# Patient Record
Sex: MALE | Race: BLACK OR AFRICAN AMERICAN | Employment: UNEMPLOYED | URBAN - METROPOLITAN AREA
[De-identification: names, ages, dates, MRNs, and addresses within clinical notes are randomized per-mention and may not be internally consistent; named-entity substitution may affect disease eponyms.]

---

## 2020-10-30 ENCOUNTER — HOSPITAL ENCOUNTER (EMERGENCY)
Age: 35
Discharge: HOME | End: 2020-10-30
Attending: EMERGENCY MEDICINE
Payer: MEDICAID

## 2020-10-30 ENCOUNTER — APPOINTMENT (OUTPATIENT)
Dept: CT IMAGING | Age: 35
End: 2020-10-30
Payer: MEDICAID

## 2020-10-30 VITALS
RESPIRATION RATE: 18 BRPM | HEIGHT: 67 IN | TEMPERATURE: 96.1 F | DIASTOLIC BLOOD PRESSURE: 72 MMHG | HEART RATE: 76 BPM | OXYGEN SATURATION: 98 % | BODY MASS INDEX: 20.4 KG/M2 | WEIGHT: 130 LBS | SYSTOLIC BLOOD PRESSURE: 112 MMHG

## 2020-10-30 PROCEDURE — 99283 EMERGENCY DEPT VISIT LOW MDM: CPT

## 2020-10-30 PROCEDURE — 96372 THER/PROPH/DIAG INJ SC/IM: CPT

## 2020-10-30 PROCEDURE — 6360000002 HC RX W HCPCS: Performed by: EMERGENCY MEDICINE

## 2020-10-30 PROCEDURE — 72125 CT NECK SPINE W/O DYE: CPT

## 2020-10-30 PROCEDURE — 70450 CT HEAD/BRAIN W/O DYE: CPT

## 2020-10-30 RX ORDER — DROPERIDOL 2.5 MG/ML
5 INJECTION, SOLUTION INTRAMUSCULAR; INTRAVENOUS ONCE
Status: COMPLETED | OUTPATIENT
Start: 2020-10-30 | End: 2020-10-30

## 2020-10-30 RX ORDER — DROPERIDOL 2.5 MG/ML
5 INJECTION, SOLUTION INTRAMUSCULAR; INTRAVENOUS ONCE
Status: DISCONTINUED | OUTPATIENT
Start: 2020-10-30 | End: 2020-10-30

## 2020-10-30 RX ORDER — MIDAZOLAM HYDROCHLORIDE 1 MG/ML
2 INJECTION INTRAMUSCULAR; INTRAVENOUS ONCE
Status: COMPLETED | OUTPATIENT
Start: 2020-10-30 | End: 2020-10-30

## 2020-10-30 RX ORDER — MIDAZOLAM HYDROCHLORIDE 1 MG/ML
2 INJECTION INTRAMUSCULAR; INTRAVENOUS ONCE
Status: DISCONTINUED | OUTPATIENT
Start: 2020-10-30 | End: 2020-10-30

## 2020-10-30 RX ORDER — KETAMINE HYDROCHLORIDE 100 MG/ML
INJECTION, SOLUTION INTRAMUSCULAR; INTRAVENOUS
Status: DISCONTINUED
Start: 2020-10-30 | End: 2020-10-30 | Stop reason: WASHOUT

## 2020-10-30 RX ORDER — MIDAZOLAM HYDROCHLORIDE 1 MG/ML
INJECTION INTRAMUSCULAR; INTRAVENOUS
Status: DISCONTINUED
Start: 2020-10-30 | End: 2020-10-30 | Stop reason: HOSPADM

## 2020-10-30 RX ADMIN — MIDAZOLAM 2 MG: 1 INJECTION INTRAMUSCULAR; INTRAVENOUS at 04:00

## 2020-10-30 RX ADMIN — DROPERIDOL 5 MG: 2.5 INJECTION, SOLUTION INTRAMUSCULAR; INTRAVENOUS at 03:50
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
## 2020-10-30 NOTE — ED NOTES
Nurse to Nurse called to [facility] [phone], spoke with Nurse Dwayne Haynes, [place]  10/30/20 9501

## 2021-11-02 ENCOUNTER — HOSPITAL ENCOUNTER (EMERGENCY)
Age: 36
Discharge: HOME | End: 2021-11-02
Attending: EMERGENCY MEDICINE
Payer: MEDICAID

## 2021-11-02 ENCOUNTER — APPOINTMENT (OUTPATIENT)
Dept: CT IMAGING | Age: 36
End: 2021-11-02
Payer: MEDICAID

## 2021-11-02 VITALS
DIASTOLIC BLOOD PRESSURE: 81 MMHG | OXYGEN SATURATION: 95 % | HEART RATE: 71 BPM | TEMPERATURE: 98 F | HEIGHT: 67 IN | RESPIRATION RATE: 16 BRPM | SYSTOLIC BLOOD PRESSURE: 128 MMHG | BODY MASS INDEX: 20.4 KG/M2 | WEIGHT: 130 LBS

## 2021-11-02 DIAGNOSIS — R10.13 ABDOMINAL PAIN, EPIGASTRIC: Primary | ICD-10-CM

## 2021-11-02 LAB
ALBUMIN SERPL-MCNC: 4.9 G/DL (ref 3.5–5.2)
ALP BLD-CCNC: 80 U/L (ref 40–129)
ALT SERPL-CCNC: 20 U/L (ref 0–40)
ANION GAP SERPL CALCULATED.3IONS-SCNC: 13 MMOL/L (ref 7–16)
AST SERPL-CCNC: 19 U/L (ref 0–39)
BASOPHILS ABSOLUTE: 0.02 E9/L (ref 0–0.2)
BASOPHILS RELATIVE PERCENT: 0.3 % (ref 0–2)
BILIRUB SERPL-MCNC: 0.8 MG/DL (ref 0–1.2)
BUN BLDV-MCNC: 11 MG/DL (ref 6–20)
CALCIUM SERPL-MCNC: 9.8 MG/DL (ref 8.6–10.2)
CHLORIDE BLD-SCNC: 98 MMOL/L (ref 98–107)
CO2: 23 MMOL/L (ref 22–29)
CREAT SERPL-MCNC: 0.9 MG/DL (ref 0.7–1.2)
EOSINOPHILS ABSOLUTE: 0.06 E9/L (ref 0.05–0.5)
EOSINOPHILS RELATIVE PERCENT: 1 % (ref 0–6)
GFR AFRICAN AMERICAN: >60
GFR NON-AFRICAN AMERICAN: >60 ML/MIN/1.73
GLUCOSE BLD-MCNC: 101 MG/DL (ref 74–99)
HCT VFR BLD CALC: 45.8 % (ref 37–54)
HEMOGLOBIN: 15.8 G/DL (ref 12.5–16.5)
IMMATURE GRANULOCYTES #: 0.01 E9/L
IMMATURE GRANULOCYTES %: 0.2 % (ref 0–5)
LACTIC ACID: 1.3 MMOL/L (ref 0.5–2.2)
LIPASE: 31 U/L (ref 13–60)
LYMPHOCYTES ABSOLUTE: 1.11 E9/L (ref 1.5–4)
LYMPHOCYTES RELATIVE PERCENT: 18.7 % (ref 20–42)
MCH RBC QN AUTO: 30.7 PG (ref 26–35)
MCHC RBC AUTO-ENTMCNC: 34.5 % (ref 32–34.5)
MCV RBC AUTO: 89.1 FL (ref 80–99.9)
MONOCYTES ABSOLUTE: 0.39 E9/L (ref 0.1–0.95)
MONOCYTES RELATIVE PERCENT: 6.6 % (ref 2–12)
NEUTROPHILS ABSOLUTE: 4.36 E9/L (ref 1.8–7.3)
NEUTROPHILS RELATIVE PERCENT: 73.2 % (ref 43–80)
PDW BLD-RTO: 12.5 FL (ref 11.5–15)
PLATELET # BLD: 183 E9/L (ref 130–450)
PMV BLD AUTO: 9.5 FL (ref 7–12)
POTASSIUM SERPL-SCNC: 4.4 MMOL/L (ref 3.5–5)
RBC # BLD: 5.14 E12/L (ref 3.8–5.8)
SODIUM BLD-SCNC: 134 MMOL/L (ref 132–146)
TOTAL PROTEIN: 8.2 G/DL (ref 6.4–8.3)
WBC # BLD: 6 E9/L (ref 4.5–11.5)

## 2021-11-02 PROCEDURE — 6360000004 HC RX CONTRAST MEDICATION: Performed by: RADIOLOGY

## 2021-11-02 PROCEDURE — 2500000003 HC RX 250 WO HCPCS: Performed by: STUDENT IN AN ORGANIZED HEALTH CARE EDUCATION/TRAINING PROGRAM

## 2021-11-02 PROCEDURE — 74177 CT ABD & PELVIS W/CONTRAST: CPT

## 2021-11-02 PROCEDURE — 6370000000 HC RX 637 (ALT 250 FOR IP): Performed by: PHYSICIAN ASSISTANT

## 2021-11-02 PROCEDURE — 85025 COMPLETE CBC W/AUTO DIFF WBC: CPT

## 2021-11-02 PROCEDURE — 99284 EMERGENCY DEPT VISIT MOD MDM: CPT

## 2021-11-02 PROCEDURE — 96372 THER/PROPH/DIAG INJ SC/IM: CPT

## 2021-11-02 PROCEDURE — 6370000000 HC RX 637 (ALT 250 FOR IP): Performed by: STUDENT IN AN ORGANIZED HEALTH CARE EDUCATION/TRAINING PROGRAM

## 2021-11-02 PROCEDURE — 96374 THER/PROPH/DIAG INJ IV PUSH: CPT

## 2021-11-02 PROCEDURE — 83690 ASSAY OF LIPASE: CPT

## 2021-11-02 PROCEDURE — 83605 ASSAY OF LACTIC ACID: CPT

## 2021-11-02 PROCEDURE — 6360000002 HC RX W HCPCS: Performed by: STUDENT IN AN ORGANIZED HEALTH CARE EDUCATION/TRAINING PROGRAM

## 2021-11-02 PROCEDURE — 80053 COMPREHEN METABOLIC PANEL: CPT

## 2021-11-02 RX ORDER — ONDANSETRON 4 MG/1
4 TABLET, ORALLY DISINTEGRATING ORAL ONCE
Status: COMPLETED | OUTPATIENT
Start: 2021-11-02 | End: 2021-11-02

## 2021-11-02 RX ORDER — DICYCLOMINE HYDROCHLORIDE 10 MG/ML
20 INJECTION INTRAMUSCULAR ONCE
Status: COMPLETED | OUTPATIENT
Start: 2021-11-02 | End: 2021-11-02

## 2021-11-02 RX ORDER — MAGNESIUM HYDROXIDE/ALUMINUM HYDROXICE/SIMETHICONE 120; 1200; 1200 MG/30ML; MG/30ML; MG/30ML
30 SUSPENSION ORAL ONCE
Status: DISCONTINUED | OUTPATIENT
Start: 2021-11-02 | End: 2021-11-02 | Stop reason: HOSPADM

## 2021-11-02 RX ORDER — OMEPRAZOLE 20 MG/1
20 CAPSULE, DELAYED RELEASE ORAL
Qty: 30 CAPSULE | Refills: 0 | Status: SHIPPED | OUTPATIENT
Start: 2021-11-02

## 2021-11-02 RX ORDER — SUCRALFATE 1 G/1
1 TABLET ORAL 4 TIMES DAILY
Qty: 120 TABLET | Refills: 0 | Status: SHIPPED | OUTPATIENT
Start: 2021-11-02

## 2021-11-02 RX ORDER — ONDANSETRON 4 MG/1
TABLET, ORALLY DISINTEGRATING ORAL
Status: DISCONTINUED
Start: 2021-11-02 | End: 2021-11-02 | Stop reason: HOSPADM

## 2021-11-02 RX ORDER — MAGNESIUM HYDROXIDE/ALUMINUM HYDROXICE/SIMETHICONE 120; 1200; 1200 MG/30ML; MG/30ML; MG/30ML
SUSPENSION ORAL
Status: DISCONTINUED
Start: 2021-11-02 | End: 2021-11-02 | Stop reason: HOSPADM

## 2021-11-02 RX ORDER — OXYCODONE HYDROCHLORIDE AND ACETAMINOPHEN 5; 325 MG/1; MG/1
1 TABLET ORAL ONCE
Status: COMPLETED | OUTPATIENT
Start: 2021-11-02 | End: 2021-11-02

## 2021-11-02 RX ORDER — LIDOCAINE HYDROCHLORIDE 20 MG/ML
5 SOLUTION OROPHARYNGEAL ONCE
Status: COMPLETED | OUTPATIENT
Start: 2021-11-02 | End: 2021-11-02

## 2021-11-02 RX ADMIN — OXYCODONE AND ACETAMINOPHEN 1 TABLET: 5; 325 TABLET ORAL at 17:34

## 2021-11-02 RX ADMIN — ONDANSETRON 4 MG: 4 TABLET, ORALLY DISINTEGRATING ORAL at 11:50

## 2021-11-02 RX ADMIN — DICYCLOMINE HYDROCHLORIDE 20 MG: 10 INJECTION INTRAMUSCULAR at 13:35

## 2021-11-02 RX ADMIN — LIDOCAINE HYDROCHLORIDE 5 ML: 20 SOLUTION OROPHARYNGEAL at 12:33

## 2021-11-02 RX ADMIN — IOPAMIDOL 90 ML: 755 INJECTION, SOLUTION INTRAVENOUS at 15:42

## 2021-11-02 RX ADMIN — FAMOTIDINE 20 MG: 10 INJECTION INTRAVENOUS at 13:35

## 2021-11-02 ASSESSMENT — ENCOUNTER SYMPTOMS
WHEEZING: 0
EYE PAIN: 0
SORE THROAT: 0
CONSTIPATION: 0
SHORTNESS OF BREATH: 0
NAUSEA: 1
PHOTOPHOBIA: 0
DIARRHEA: 0
RHINORRHEA: 0
COUGH: 0
ABDOMINAL PAIN: 1
TROUBLE SWALLOWING: 0
VOMITING: 0
CHEST TIGHTNESS: 0
EYE REDNESS: 0
BACK PAIN: 0
APNEA: 0

## 2021-11-02 ASSESSMENT — PAIN DESCRIPTION - DESCRIPTORS: DESCRIPTORS: CRAMPING

## 2021-11-02 ASSESSMENT — PAIN DESCRIPTION - LOCATION: LOCATION: ABDOMEN

## 2021-11-02 ASSESSMENT — PAIN SCALES - GENERAL
PAINLEVEL_OUTOF10: 10
PAINLEVEL_OUTOF10: 8

## 2021-11-02 ASSESSMENT — PAIN DESCRIPTION - PAIN TYPE: TYPE: ACUTE PAIN

## 2021-11-02 ASSESSMENT — PAIN DESCRIPTION - ORIENTATION: ORIENTATION: MID

## 2021-11-02 NOTE — ED NOTES
FIRST PROVIDER CONTACT ASSESSMENT NOTE           Department of Emergency Medicine                 First Provider Note            21  11:32 AM EDT    Date of Encounter: No admission date for patient encounter. Patient Name: Pedro Stubbs  : 1985  MRN: [id number]    Chief Complaint: Abdominal Pain (middle epigastric pain that started yesterday. Per pt feels like it is coming up in throat. Nausea w/o emesis.)      History of Present Illness:   Pedro Stubbs is a 29 y.o. male who presents to the ED for abdominal pain. Midepigastric that started yesterday. Patient feels like something is \"going to come up his throat. \"  Patient has nausea but no emesis. Patient recently returned from [facility]    Focused Physical Exam:  VS:    ED Triage Vitals [21 1053]   BP Temp Temp src Pulse Resp SpO2 Height Weight   -- -- -- 83 -- 96 % -- --        Physical Ex: Constitutional: Alert and non-toxic. Medical History:  has a past medical history of ADHD (attention deficit hyperactivity disorder), Asthma, Depression, and Insomnia. Surgical History:  has no past surgical history on file. Social History:  reports that he has been smoking cigarettes. He has been smoking about 1.00 pack per day. He has never used smokeless tobacco. He reports current alcohol use. He reports current drug use. Drug: Marijuana [employer]der Sails). Family History: family history is not on file. Allergies: Patient has no known allergies.      Initial Plan of Care: Initiate Treatment-Testing, Proceed toTreatment Area When Bed Available for ED Attending/MLP to Continue Care      ---END OF FIRST PROVIDER CONTACT ASSESSMENT NOTE---  Electronically signed by Silas Bal PA-C   DD: 21       Silas Bal PA-C  21 [id number]

## 2021-11-02 NOTE — ED PROVIDER NOTES
[address]      Pt Name: Carmelina Fan  MRN: [id number]  [place] 1985  Date of evaluation: 11/2/2021      CHIEF COMPLAINT       Chief Complaint   Patient presents with    Abdominal Pain     middle epigastric pain that started yesterday. Per pt feels like it is coming up in throat. Nausea w/o emesis. HPI  Carmelina Fan is a 29 y.o. male history of asthma who presents to the emergency department with abdominal pain. The patient states that for the last day or so he has had abdominal pain. He states that it goes from his epigastrium down towards his bellybutton. He describes a sharp pain. He states he has associated nausea but no vomiting. Pain is constant, mild to moderate, nothing makes it better or worse. GI cocktail given in triage did not help the symptoms. He denies any history of abdominal surgeries. Denies any cough, fevers, chills, chest pain or shortness of breath. He occasionally takes his ibuprofen. He was in [place] and had been drinking Nancy just prior to the abdominal pain. Review of Systems   Constitutional: Negative for activity change, chills, diaphoresis, fatigue and fever. HENT: Negative for rhinorrhea, sore throat and trouble swallowing. Eyes: Negative for photophobia, pain and redness. Respiratory: Negative for apnea, cough, chest tightness, shortness of breath and wheezing. Cardiovascular: Negative for chest pain, palpitations and leg swelling. Gastrointestinal: Positive for abdominal pain and nausea. Negative for constipation, diarrhea and vomiting. Endocrine: Negative for polyuria. Genitourinary: Negative for difficulty urinating and dysuria. Musculoskeletal: Negative for back pain, neck pain and neck stiffness. Skin: Negative for pallor and rash. Neurological: Negative for dizziness, syncope, weakness, light-headedness and numbness. Psychiatric/Behavioral: Negative for confusion. The patient is not nervous/anxious. Physical Exam  Vitals and nursing note reviewed. Constitutional:       General: He is not in acute distress. Appearance: He is well-developed. Comments: Awake and alert. Sitting in the gurney in no obvious distress. HENT:      Head: Normocephalic and atraumatic. Mouth/Throat:      Mouth: Mucous membranes are moist.      Pharynx: No oropharyngeal exudate. Eyes:      General: No scleral icterus. Pupils: Pupils are equal, round, and reactive to light. Cardiovascular:      Rate and Rhythm: Normal rate and regular rhythm. Heart sounds: Normal heart sounds. No murmur heard. Comments: 2+ radial and dorsal pedis pulses bilaterally  Pulmonary:      Effort: Pulmonary effort is normal. No respiratory distress. Breath sounds: Normal breath sounds. No wheezing. Abdominal:      Palpations: Abdomen is soft. Tenderness: There is no abdominal tenderness. There is no guarding or rebound. Musculoskeletal:         General: No tenderness or deformity. Normal range of motion. Cervical back: Normal range of motion and neck supple. Right lower leg: No edema. Left lower leg: No edema. Skin:     General: Skin is warm and dry. Capillary Refill: Capillary refill takes less than 2 seconds. Findings: No rash. Neurological:      General: No focal deficit present. Mental Status: He is alert and oriented to person, place, and time. Cranial Nerves: No cranial nerve deficit. Sensory: No sensory deficit. Motor: No weakness or abnormal muscle tone. Psychiatric:         Mood and Affect: Mood normal.         Behavior: Behavior normal.          Procedures     MDM   This is a 72-year-old male who presents to the emergency department with epigastric pain.   In the emergency department the patient is awake and alert, hemodynamically stable, afebrile and in no respiratory distress. Abdomen soft nontender. CT imaging showed no signs of acute intra-abdominal pathology. Patient symptoms more consistent with possible peptic ulcer disease versus GERD. Patient feeling better after supportive therapy in the ED. Metabolic panel showed normal electrolytes normal renal function. Lactic acid reassuring. No leukocytosis. Repeat abdominal exam showed benign abdomen. Discussed plan for discharge home as well as return precautions and plan for close outpatient follow-up with GI and patient understands and agrees to plan.                    --------------------------------------------- PAST HISTORY ---------------------------------------------  Past Medical History:  has a past medical history of ADHD (attention deficit hyperactivity disorder), Asthma, Depression, and Insomnia. Past Surgical History:  has no past surgical history on file. Social History:  reports that he has been smoking cigarettes. He has been smoking about 1.00 pack per day. He has never used smokeless tobacco. He reports current alcohol use. He reports current drug use. Drug: Marijuana Charmayne Stai). Family History: family history is not on file. The patients home medications have been reviewed. Allergies: Patient has no known allergies.     -------------------------------------------------- RESULTS -------------------------------------------------  Labs:  Results for orders placed or performed during the hospital encounter of 11/02/21   CBC auto differential   Result Value Ref Range    WBC 6.0 4.5 - 11.5 E9/L    RBC 5.14 3.80 - 5.80 E12/L    Hemoglobin 15.8 12.5 - 16.5 g/dL    Hematocrit 45.8 37.0 - 54.0 %    MCV 89.1 80.0 - 99.9 fL    MCH 30.7 26.0 - 35.0 pg    MCHC 34.5 32.0 - 34.5 %    RDW 12.5 11.5 - 15.0 fL    Platelets 855 725 - 422 E9/L    MPV 9.5 7.0 - 12.0 fL    Neutrophils % 73.2 43.0 - 80.0 %    Immature Granulocytes % 0.2 0.0 - 5.0 %    Lymphocytes % 18.7 (L) 20.0 - 42.0 %    Monocytes % 6.6 2.0 - 12.0 %    Eosinophils % 1.0 0.0 - 6.0 %    Basophils % 0.3 0.0 - 2.0 %    Neutrophils Absolute 4.36 1.80 - 7.30 E9/L    Immature Granulocytes # 0.01 E9/L    Lymphocytes Absolute 1.11 (L) 1.50 - 4.00 E9/L    Monocytes Absolute 0.39 0.10 - 0.95 E9/L    Eosinophils Absolute 0.06 0.05 - 0.50 E9/L    Basophils Absolute 0.02 0.00 - 0.20 E9/L   Lactic Acid, Plasma   Result Value Ref Range    Lactic Acid 1.3 0.5 - 2.2 mmol/L   Lipase   Result Value Ref Range    Lipase 31 13 - 60 U/L   Comprehensive Metabolic Panel   Result Value Ref Range    Sodium 134 132 - 146 mmol/L    Potassium 4.4 3.5 - 5.0 mmol/L    Chloride 98 98 - 107 mmol/L    CO2 23 22 - 29 mmol/L    Anion Gap 13 7 - 16 mmol/L    Glucose 101 (H) 74 - 99 mg/dL    BUN 11 6 - 20 mg/dL    CREATININE 0.9 0.7 - 1.2 mg/dL    GFR Non-African American >60 >=60 mL/min/1.73    GFR African American >60     Calcium 9.8 8.6 - 10.2 mg/dL    Total Protein 8.2 6.4 - 8.3 g/dL    Albumin 4.9 3.5 - 5.2 g/dL    Total Bilirubin 0.8 0.0 - 1.2 mg/dL    Alkaline Phosphatase 80 40 - 129 U/L    ALT 20 0 - 40 U/L    AST 19 0 - 39 U/L       Radiology:  CT ABDOMEN PELVIS W IV CONTRAST Additional Contrast? None   Final Result   Mildly dilated loops of small bowel in the mid abdomen may represent focal   ileus. Thickening of the gastric antrum, suspicious for gastritis. No evidence of obstructive uropathy or acute appendicitis.             ------------------------- NURSING NOTES AND VITALS REVIEWED ---------------------------  Date / Time Roomed:  11/2/2021 12:53 PM  ED Bed Assignment:  JOSE/JOSE    The nursing notes within the ED encounter and vital signs as below have been reviewed.    /81   Pulse 71   Temp 98 °F (36.7 °C)   Resp 16   Ht 5' 7\" (1.702 m)   Wt 130 lb (59 kg)   SpO2 95%   BMI 20.36 kg/m²   Oxygen Saturation Interpretation: Normal      ------------------------------------------ PROGRESS NOTES ------------------------------------------    I have spoken with the patient and discussed todays results, in addition to providing specific details for the plan of care and counseling regarding the diagnosis and prognosis. Their questions are answered at this time and they are agreeable with the plan. I discussed at length with them reasons for immediate return here for re evaluation. They will followup with their primary care physician by calling their office tomorrow. --------------------------------- ADDITIONAL PROVIDER NOTES ---------------------------------  At this time the patient is without objective evidence of an acute process requiring hospitalization or inpatient management. They have remained hemodynamically stable throughout their entire ED visit and are stable for discharge with outpatient follow-up. The plan has been discussed in detail and they are aware of the specific conditions for emergent return, as well as the importance of follow-up. Discharge Medication List as of 11/2/2021  4:37 PM      START taking these medications    Details   sucralfate (CARAFATE) 1 GM tablet Take 1 tablet by mouth 4 times daily, Disp-120 tablet, R-0Print      omeprazole (PRILOSEC) 20 MG delayed release capsule Take 1 capsule by mouth every morning (before breakfast), Disp-30 capsule, R-0Print             Diagnosis:  1. Abdominal pain, epigastric        Disposition:  Patient's disposition: Discharge to home  Patient's condition is stable.        Phyllis Moura DO  Resident  11/03/21 5256

## 2021-11-02 NOTE — ED NOTES
Bed: HBB  Expected date:   Expected time:   Means of arrival:   Comments:  triage     Acacia De La Cruz RN  11/02/21 6841

## 2021-11-02 NOTE — Clinical Note
Jordyn Angel was seen and treated in our emergency department on 11/2/2021. He may return to work on 11/03/2021. If you have any questions or concerns, please don't hesitate to call.       Lionel Ayers, DO

## 2022-12-04 ENCOUNTER — APPOINTMENT (OUTPATIENT)
Dept: GENERAL RADIOLOGY | Age: 37
End: 2022-12-04
Payer: MEDICAID

## 2022-12-04 ENCOUNTER — HOSPITAL ENCOUNTER (EMERGENCY)
Age: 37
Discharge: HOME | End: 2022-12-05
Attending: EMERGENCY MEDICINE
Payer: MEDICAID

## 2022-12-04 ENCOUNTER — HOSPITAL ENCOUNTER (EMERGENCY)
Age: 37
Discharge: LEFT BEFORE DISCHARGE | End: 2022-12-04
Attending: EMERGENCY MEDICINE

## 2022-12-04 DIAGNOSIS — F14.10 COCAINE ABUSE (HCC): ICD-10-CM

## 2022-12-04 DIAGNOSIS — S43.102A SEPARATION OF LEFT ACROMIOCLAVICULAR JOINT, INITIAL ENCOUNTER: ICD-10-CM

## 2022-12-04 DIAGNOSIS — F10.920 ACUTE ALCOHOLIC INTOXICATION WITHOUT COMPLICATION (HCC): Primary | ICD-10-CM

## 2022-12-04 DIAGNOSIS — M25.512 ACUTE PAIN OF LEFT SHOULDER: Primary | ICD-10-CM

## 2022-12-04 DIAGNOSIS — F39 MOOD DISORDER (HCC): ICD-10-CM

## 2022-12-04 PROCEDURE — 23540 CLTX ACROMCLAV DISLC WO MNPJ: CPT

## 2022-12-04 PROCEDURE — 99285 EMERGENCY DEPT VISIT HI MDM: CPT

## 2022-12-04 PROCEDURE — 96372 THER/PROPH/DIAG INJ SC/IM: CPT

## 2022-12-04 PROCEDURE — 73030 X-RAY EXAM OF SHOULDER: CPT

## 2022-12-05 ENCOUNTER — APPOINTMENT (OUTPATIENT)
Dept: CT IMAGING | Age: 37
End: 2022-12-05
Payer: MEDICAID

## 2022-12-05 VITALS
DIASTOLIC BLOOD PRESSURE: 93 MMHG | RESPIRATION RATE: 22 BRPM | SYSTOLIC BLOOD PRESSURE: 143 MMHG | TEMPERATURE: 97.8 F | HEART RATE: 64 BPM | OXYGEN SATURATION: 99 %

## 2022-12-05 LAB
ACETAMINOPHEN LEVEL: <5 MCG/ML (ref 10–30)
ALBUMIN SERPL-MCNC: 4 G/DL (ref 3.5–5.2)
ALP BLD-CCNC: 83 U/L (ref 40–129)
ALT SERPL-CCNC: 16 U/L (ref 0–40)
AMPHETAMINE SCREEN, URINE: NOT DETECTED
ANION GAP SERPL CALCULATED.3IONS-SCNC: 12 MMOL/L (ref 7–16)
AST SERPL-CCNC: 29 U/L (ref 0–39)
BACTERIA: ABNORMAL /HPF
BARBITURATE SCREEN URINE: NOT DETECTED
BASOPHILS ABSOLUTE: 0.02 E9/L (ref 0–0.2)
BASOPHILS RELATIVE PERCENT: 0.4 % (ref 0–2)
BENZODIAZEPINE SCREEN, URINE: NOT DETECTED
BILIRUB SERPL-MCNC: 0.3 MG/DL (ref 0–1.2)
BILIRUBIN URINE: NEGATIVE
BLOOD, URINE: ABNORMAL
BUN BLDV-MCNC: 7 MG/DL (ref 6–20)
CALCIUM SERPL-MCNC: 8.7 MG/DL (ref 8.6–10.2)
CANNABINOID SCREEN URINE: POSITIVE
CHLORIDE BLD-SCNC: 104 MMOL/L (ref 98–107)
CLARITY: CLEAR
CO2: 24 MMOL/L (ref 22–29)
COCAINE METABOLITE SCREEN URINE: POSITIVE
COLOR: YELLOW
CREAT SERPL-MCNC: 0.8 MG/DL (ref 0.7–1.2)
EKG ATRIAL RATE: 77 BPM
EKG P AXIS: 86 DEGREES
EKG P-R INTERVAL: 146 MS
EKG Q-T INTERVAL: 404 MS
EKG QRS DURATION: 100 MS
EKG QTC CALCULATION (BAZETT): 457 MS
EKG R AXIS: 88 DEGREES
EKG T AXIS: 60 DEGREES
EKG VENTRICULAR RATE: 77 BPM
EOSINOPHILS ABSOLUTE: 0.04 E9/L (ref 0.05–0.5)
EOSINOPHILS RELATIVE PERCENT: 0.8 % (ref 0–6)
EPITHELIAL CELLS, UA: ABNORMAL /HPF
ETHANOL: 120 MG/DL (ref 0–0.08)
ETHANOL: 222 MG/DL (ref 0–0.08)
FENTANYL SCREEN, URINE: NOT DETECTED
GFR SERPL CREATININE-BSD FRML MDRD: >60 ML/MIN/1.73
GLUCOSE BLD-MCNC: 94 MG/DL (ref 74–99)
GLUCOSE URINE: NEGATIVE MG/DL
HCT VFR BLD CALC: 39.3 % (ref 37–54)
HEMOGLOBIN: 13.3 G/DL (ref 12.5–16.5)
IMMATURE GRANULOCYTES #: 0.01 E9/L
IMMATURE GRANULOCYTES %: 0.2 % (ref 0–5)
KETONES, URINE: NEGATIVE MG/DL
LEUKOCYTE ESTERASE, URINE: NEGATIVE
LYMPHOCYTES ABSOLUTE: 1.13 E9/L (ref 1.5–4)
LYMPHOCYTES RELATIVE PERCENT: 22.8 % (ref 20–42)
Lab: ABNORMAL
MCH RBC QN AUTO: 30.9 PG (ref 26–35)
MCHC RBC AUTO-ENTMCNC: 33.8 % (ref 32–34.5)
MCV RBC AUTO: 91.4 FL (ref 80–99.9)
METHADONE SCREEN, URINE: NOT DETECTED
MONOCYTES ABSOLUTE: 0.37 E9/L (ref 0.1–0.95)
MONOCYTES RELATIVE PERCENT: 7.5 % (ref 2–12)
NEUTROPHILS ABSOLUTE: 3.39 E9/L (ref 1.8–7.3)
NEUTROPHILS RELATIVE PERCENT: 68.3 % (ref 43–80)
NITRITE, URINE: NEGATIVE
OPIATE SCREEN URINE: NOT DETECTED
OXYCODONE URINE: NOT DETECTED
PDW BLD-RTO: 12.7 FL (ref 11.5–15)
PH UA: 5.5 (ref 5–9)
PHENCYCLIDINE SCREEN URINE: NOT DETECTED
PLATELET # BLD: 181 E9/L (ref 130–450)
PMV BLD AUTO: 9.2 FL (ref 7–12)
POTASSIUM SERPL-SCNC: 3 MMOL/L (ref 3.5–5)
PROTEIN UA: NEGATIVE MG/DL
RBC # BLD: 4.3 E12/L (ref 3.8–5.8)
RBC UA: ABNORMAL /HPF (ref 0–2)
SALICYLATE, SERUM: <0.3 MG/DL (ref 0–30)
SODIUM BLD-SCNC: 140 MMOL/L (ref 132–146)
SPECIFIC GRAVITY UA: 1.01 (ref 1–1.03)
TOTAL CK: 552 U/L (ref 20–200)
TOTAL PROTEIN: 6.7 G/DL (ref 6.4–8.3)
TRICYCLIC ANTIDEPRESSANTS SCREEN SERUM: NEGATIVE NG/ML
TROPONIN, HIGH SENSITIVITY: <6 NG/L (ref 0–11)
UROBILINOGEN, URINE: 1 E.U./DL
WBC # BLD: 5 E9/L (ref 4.5–11.5)
WBC UA: ABNORMAL /HPF (ref 0–5)

## 2022-12-05 PROCEDURE — 81001 URINALYSIS AUTO W/SCOPE: CPT

## 2022-12-05 PROCEDURE — 6360000002 HC RX W HCPCS: Performed by: NURSE PRACTITIONER

## 2022-12-05 PROCEDURE — 72125 CT NECK SPINE W/O DYE: CPT

## 2022-12-05 PROCEDURE — 70450 CT HEAD/BRAIN W/O DYE: CPT

## 2022-12-05 PROCEDURE — 6360000002 HC RX W HCPCS: Performed by: EMERGENCY MEDICINE

## 2022-12-05 PROCEDURE — 6370000000 HC RX 637 (ALT 250 FOR IP): Performed by: EMERGENCY MEDICINE

## 2022-12-05 PROCEDURE — 80307 DRUG TEST PRSMV CHEM ANLYZR: CPT

## 2022-12-05 PROCEDURE — 93005 ELECTROCARDIOGRAM TRACING: CPT | Performed by: NURSE PRACTITIONER

## 2022-12-05 PROCEDURE — 80053 COMPREHEN METABOLIC PANEL: CPT

## 2022-12-05 PROCEDURE — 85025 COMPLETE CBC W/AUTO DIFF WBC: CPT

## 2022-12-05 PROCEDURE — 82550 ASSAY OF CK (CPK): CPT

## 2022-12-05 PROCEDURE — 80143 DRUG ASSAY ACETAMINOPHEN: CPT

## 2022-12-05 PROCEDURE — 36415 COLL VENOUS BLD VENIPUNCTURE: CPT

## 2022-12-05 PROCEDURE — 84484 ASSAY OF TROPONIN QUANT: CPT

## 2022-12-05 PROCEDURE — 93010 ELECTROCARDIOGRAM REPORT: CPT | Performed by: INTERNAL MEDICINE

## 2022-12-05 PROCEDURE — 82077 ASSAY SPEC XCP UR&BREATH IA: CPT

## 2022-12-05 PROCEDURE — 6370000000 HC RX 637 (ALT 250 FOR IP): Performed by: NURSE PRACTITIONER

## 2022-12-05 PROCEDURE — 80179 DRUG ASSAY SALICYLATE: CPT

## 2022-12-05 RX ORDER — KETOROLAC TROMETHAMINE 30 MG/ML
30 INJECTION, SOLUTION INTRAMUSCULAR; INTRAVENOUS ONCE
Status: COMPLETED | OUTPATIENT
Start: 2022-12-05 | End: 2022-12-05

## 2022-12-05 RX ORDER — ZIPRASIDONE MESYLATE 20 MG/ML
10 INJECTION, POWDER, LYOPHILIZED, FOR SOLUTION INTRAMUSCULAR ONCE
Status: COMPLETED | OUTPATIENT
Start: 2022-12-05 | End: 2022-12-05

## 2022-12-05 RX ORDER — OXYCODONE HYDROCHLORIDE AND ACETAMINOPHEN 5; 325 MG/1; MG/1
1 TABLET ORAL ONCE
Status: COMPLETED | OUTPATIENT
Start: 2022-12-05 | End: 2022-12-05

## 2022-12-05 RX ORDER — WATER 1000 ML/1000ML
INJECTION, SOLUTION INTRAVENOUS
Status: DISCONTINUED
Start: 2022-12-05 | End: 2022-12-05 | Stop reason: HOSPADM

## 2022-12-05 RX ORDER — DIPHENHYDRAMINE HYDROCHLORIDE 50 MG/ML
INJECTION INTRAMUSCULAR; INTRAVENOUS
Status: DISCONTINUED
Start: 2022-12-05 | End: 2022-12-05 | Stop reason: HOSPADM

## 2022-12-05 RX ORDER — LORAZEPAM 2 MG/ML
1 INJECTION INTRAMUSCULAR ONCE
Status: COMPLETED | OUTPATIENT
Start: 2022-12-05 | End: 2022-12-05

## 2022-12-05 RX ORDER — POTASSIUM CHLORIDE 20 MEQ/1
40 TABLET, EXTENDED RELEASE ORAL ONCE
Status: COMPLETED | OUTPATIENT
Start: 2022-12-05 | End: 2022-12-05

## 2022-12-05 RX ADMIN — KETOROLAC TROMETHAMINE 30 MG: 30 INJECTION, SOLUTION INTRAMUSCULAR; INTRAVENOUS at 08:29

## 2022-12-05 RX ADMIN — ZIPRASIDONE MESYLATE 10 MG: 20 INJECTION, POWDER, LYOPHILIZED, FOR SOLUTION INTRAMUSCULAR at 00:40

## 2022-12-05 RX ADMIN — POTASSIUM CHLORIDE 40 MEQ: 1500 TABLET, EXTENDED RELEASE ORAL at 05:28

## 2022-12-05 RX ADMIN — LORAZEPAM 1 MG: 2 INJECTION INTRAMUSCULAR; INTRAVENOUS at 00:39

## 2022-12-05 RX ADMIN — OXYCODONE AND ACETAMINOPHEN 1 TABLET: 5; 325 TABLET ORAL at 08:29

## 2022-12-05 ASSESSMENT — PAIN SCALES - GENERAL: PAINLEVEL_OUTOF10: 10

## 2022-12-05 NOTE — ED NOTES
PEER came to talk with pt and he is not interested in AOD services at this time. PEER provided educational information.      JASON Lainez  12/05/22 8218

## 2022-12-05 NOTE — ED NOTES
Pt medicated per EMAR. Pt combative with staff and security. Notified pt placed under arrest by police while at scan, pt police hold at this time. Pt notified of need of CT scans, ekg, and bloodwork. Pt repeatedly screaming he is a grown man and pays taxes and being disruptive/argumentative with staff.         Jose Roberts RN  12/05/22 2728
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
## 2022-12-05 NOTE — ED NOTES
Security present in triage. Pt overall not cooperative with answering questions, being verbally aggressive with answers and yelling. Educated of need of security for staff safety. Pt to [facility]. MD Neeraj Dobbins present for eval. Pt educated on need of xray imaging.         Sara Hobbs RN  12/04/22 0838

## 2022-12-05 NOTE — ED NOTES
CO paperwork faxed to staffing   Spoke with staffing they will send yessenia Salcedo RN  12/05/22 5180

## 2022-12-05 NOTE — ED NOTES
Pt screaming out \"we went to the wrong fucking place. haven't you seen the news. They don't fucking care about us black folk\" as pt was pulling at his c-collar that the dr and np had placed onto pt as he was sent in with family or friends. Family or friend who is with pt states that he has been drinking today. Pt continued to yell at staff and pt asked to go to lobby and when he can be civil and tell us what happened to come back in.  Pt remained loud and rude making racist comments telling his family or friend to take him to Domingo Razo, RN  12/04/22 4232       Bernard Keith RN  12/04/22 4662

## 2022-12-05 NOTE — ED NOTES
Pt was getting verbally aggressive toward staff. Pt states that he has not gotten care all night. Pt states that he is suing this hospital. This RN tried educating pt why he is here and that we needed another alcohol level. Pt was not being cooperative for this RN. [place] police called to room. After [facility] police spoke with pt he was agreeable to get labs drawn. Complete linen change provided due to incontinence.       Wing Mcfarland  12/05/22 9787

## 2022-12-05 NOTE — ED NOTES
Pt uncooperative in super track area. Security present. Pt moved to room CO-1.       Myrle Apley, RN  12/05/22 3548

## 2022-12-05 NOTE — ED NOTES
Patient arrived with family, patient yelling that he was in pain in his shoulder, family was at patient's side. This RN made several attempts to obtain pulse and temperature on patient. This patient was not cooperating with staff and refusing for any staff to help patient. Patient's family also not being very cooperative with staff. Patient swearing at staff and family. Patient was brought in to be triage and also not cooperating with staff in triage room. Patient escorted out back to [facility] and it was explained that patient would be assessed once patient calmed down. Patient yelling and swearing out in [facility], swearing at family member. Patient ripped off c-collar and walked out, while swearing and threatening staff and PD.      Tierney Mota RN  12/04/22 4228

## 2022-12-05 NOTE — DISCHARGE INSTRUCTIONS
Please reach out to one of the following community providers for treatment and support. Help Hotline: 510 341 019 or 1818 [address] and Recovery Board [phone]  [place] [address] and [address] [facility] Board [phone]  If you are in need of help and experiencing any barriers to care please contact help hotline 24 hours a day and/or your local board of mental health and recovery during normal business hours. Detox program inpatient:   [facility] Robertson [phone]  Shelley Plaster [phone]  [facility] [phone]  [facility] [phone]  Merian Pace [phone]  [place] services hotline [phone]    Outpatient programs  [address] outpatient treatment [phone] ext. [place] [phone]  [facility] [phone]  [facility] services:   [facility]  [place] [phone]  [facility] [phone]  [facility]  [phone]  [facility] [phone]   [place] [facility] [phone]  [facility] [place] [phone]    For additional resource support please feel free to contact the behavioral access line at [phone] or the Intensive outpatient department at [phone]. Help Hotline 848 459-2995 or 4-182.229.3822 or 211   24 hour crisis line for the following [address]. [employer]    PEER WARM LINE: [phone]  Monday through Friday 4pm to 8pm and Saturday 1pm-3pm   You can call during these times to talk with a peer support person as needed
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
## 2022-12-05 NOTE — ED NOTES
I met with pt, who is alert, oriented x's 3, shared good eye contact, has clear speech and communicates well. ETOH is now 120  Pt reported that he was with friend, drinking and watching football yesterday. He said that he and his friend started wrestling and he injured his shoulder. Pt denies having an addiction problem with alcohol. Pt denies SI, no HI and no hallucinations. His demeanor is somewhat odd, and appears to be at baseline - possibly has some limited cognition. Pt was mainly focused on \"getting some pain pills\". Pt did request information on \"Mental health help\". He explained that when he was younger, he attended D&E for ADHD and would now like to seek adult [place] [id number] services. I educated pt on walk in services with [facility]. Pt does know where the facility is and he advised that he will follow through. Discussed dispo with Dr. Sarmad Garcia.           JASON Naranjo  12/05/22 [id number]